# Patient Record
Sex: FEMALE | Race: BLACK OR AFRICAN AMERICAN | NOT HISPANIC OR LATINO
[De-identification: names, ages, dates, MRNs, and addresses within clinical notes are randomized per-mention and may not be internally consistent; named-entity substitution may affect disease eponyms.]

---

## 2017-12-09 ENCOUNTER — RESULT REVIEW (OUTPATIENT)
Age: 52
End: 2017-12-09

## 2023-11-03 ENCOUNTER — HOSPITAL ENCOUNTER (OUTPATIENT)
Dept: PHYSICAL THERAPY | Age: 58
Setting detail: RECURRING SERIES
Discharge: HOME | End: 2023-11-06
Payer: MEDICARE

## 2023-11-03 DIAGNOSIS — N39.41 URGE INCONTINENCE: Primary | ICD-10-CM

## 2023-11-03 DIAGNOSIS — R27.8 OTHER LACK OF COORDINATION: ICD-10-CM

## 2023-11-03 PROCEDURE — 97530 THERAPEUTIC ACTIVITIES: CPT

## 2023-11-03 PROCEDURE — 97162 PT EVAL MOD COMPLEX 30 MIN: CPT

## 2023-11-03 NOTE — THERAPY EVALUATION
intervention due to above mentioned deficits. Total Duration:  Time In: 1055  Time Out: 925 [facility] therapy, I certify that the treatment plan above will be carried out by a therapist or under their direction.   Thank you for this referral,  Joel Hayes, PT, DPT  Referring Physician Signature: Rivka Styles MD _______________________________ Date _____________        Post Session Pain  Charge Capture  PT Visit Info MD Guidelines  Olaf

## 2023-11-03 NOTE — PROGRESS NOTES
Colonic massage     Toilet positioning     Defecation dynamics     Sources of fiber     Return to intercourse/vaginal trainers/wand     Perineal massage     Sexual positioning     Lubricants/vaginal moisturizers     Vulvovaginal health/vaginal irritants     Body mechanics     Posture/ergonomics     Diaphragmatic breathing Supine 11/03/23   Pain science education     Resources and technology     Other patient education          THERAPEUTIC EXERCISE: (0 minutes):    Exercises per grid below to improve  -- . Required no  --  cues to  -- .  Progressed  --  as indicated. Date:  11/03/23 Date:   Date:     Activity/Exercise Parameters Parameters Parameters   Home Exercise Program (HEP) education  [employer] HEP access code --  [email or web address]  Focus on diaphragmatic breathing 5-10'/day, water intake, implementing urge suppression                    MANUAL THERAPY: (0 minutes):   --  was utilized and necessary because of the patient's  -- .   Date Type Location Comments   11/03/23 Internal assessment/treatment Via vaginal canal See eval                                       (Used abbreviations: MET - muscle energy technique; SCS- Strain counter strain; CTM-Connective tissue mobilizations; CR- Contract/Relax; SP- Sustained pressure; PIT- Positional inhibition techniques; STM Soft -tissue mobilization; MM- Myofascial mobilization; TrP-Trigger point release; IASTM- Instrument assisted soft tissue mobilizations, TDN-Trigger point dry needling)    Pt gives verbal consent to internal vaginal and rectal assessment/treatment without chaperon present. Treatment/Session Summary:    >Treatment Assessment:  Pt gives Verbal consent to internal vaginal and rectal assessment/treatment without chaperon present. Pt reports good understanding of plan of care, as well as prescribed home exercise program.  All questions were answered to pt's satisfaction.  Pt was invited to call with any further questions or

## 2023-11-04 ASSESSMENT — PAIN SCALES - GENERAL: PAINLEVEL_OUTOF10: 0

## 2023-11-09 NOTE — PROGRESS NOTES
Joleen Herrera  : 1965  Primary: Joselyn Clay Ppo (Medicare Managed)  Secondary:  [facility] [address] [address]  Phone: [phone]  Fax: [phone] Plan Frequency: 1x/week    Plan of Care/Certification Expiration Date: 24      >PT Visit Info:  Plan Frequency: 1x/week  Plan of Care/Certification Expiration Date: 24  No Show: 0  Canceled Appointment: 0      Visit Count:  2    OUTPATIENT PHYSICAL THERAPY: Treatment Note 11/10/2023       Episode  }Appt Desk               Treatment Diagnosis:    Urge incontinence  Other lack of coordination  Contributing Diagnosis:  Fecal smearing (Fecal soiling) (R15.1), Hesitancy of micturition (R39.11), Nocturia (R35.1), Overactive bladder (Detrusor muscle hyperactivity) (N32.81), Pelvic floor dysfunction in female (M62.98), and Urgency of urination (R39.15)  Medical/Referring Diagnosis:  Other specified urinary incontinence [N39.498]  Referring Physician:  Nancy Blake MD MD Orders:  PT Eval and Treat   Date of Onset:  Onset Date: 21     Allergies:   Patient has no allergy information on record. Restrictions/Precautions:  Restrictions/Precautions: Fall Risk  No data recorded     Interventions Planned (Treatment may consist of any combination of the following):    Current Treatment Recommendations: Strengthening; ROM; Balance training; Functional mobility training; Transfer training; ADL/Self-care training; IADL training; Endurance training; Gait training; Neuromuscular re-education; Manual; Stair training; Pain management; Return to work related activity; Home exercise program; Safety education & training; Patient/Caregiver education & training; Therapeutic activities       >Subjective Comments:  fecal incontinence, UUI, difficulty starting urine stream, tenderness with PFM palpation    Mera Moulding again this week while carrying groceries into house.   Started taking Meloxicam

## 2023-11-10 ENCOUNTER — HOSPITAL ENCOUNTER (OUTPATIENT)
Dept: PHYSICAL THERAPY | Age: 58
Setting detail: RECURRING SERIES
Discharge: HOME | End: 2023-11-13
Payer: MEDICARE

## 2023-11-10 PROCEDURE — 97530 THERAPEUTIC ACTIVITIES: CPT

## 2023-11-10 PROCEDURE — 97110 THERAPEUTIC EXERCISES: CPT

## 2023-11-16 NOTE — PROGRESS NOTES
Activity/Exercise Parameters Parameters Parameters   Home Exercise Program (HEP) education  Alisa Bergeron access code [facility]  [email or web address]  Focus on diaphragmatic breathing 5-10'/day, water intake, implementing urge suppression [facility] initiated Updated [employer] HEP   Open book coordinated w/breath  X10 B X10 B   Lower trunk rotation  X10 B X10 B   Stretches   Knee to opposite shoulder, adductor, pelvic floor, figure 4                        MANUAL THERAPY: (30 minutes): Soft tissue mobilization was utilized and necessary because of the patient's restricted motion of soft tissue. Date Type Location Comments   11/03/23 Internal assessment/treatment Via vaginal canal See eval   11/17/23 STM, CTM B adductors Mild restriction at distal quad/adductor interface L > R    MM Abdominals Very tender and painful                           (Used abbreviations: MET - muscle energy technique; SCS- Strain counter strain; CTM-Connective tissue mobilizations; CR- Contract/Relax; SP- Sustained pressure; PIT- Positional inhibition techniques; STM Soft -tissue mobilization; MM- Myofascial mobilization; TrP-Trigger point release; IASTM- Instrument assisted soft tissue mobilizations, TDN-Trigger point dry needling)        Treatment/Session Summary:    >Treatment Assessment:  Quite a bit of tension and pain in abdominals. She would benefit from continued manual therapy here. Communication/Consultation:  None today  Equipment provided today:   Handouts provided:  Updated [address] handouts. Recommendations/Intent for next treatment session: Next visit will focus on:  Check blood pressure at beginning of session. Manual therapy for abdominals. Review bladder diary. Progress therapeutic exercise promoting full PFM and diaphragm mobility. Consideration of biofeedback for PFM drops.   Future:  manual reassessment of PFM via vaginal canal.    >Total Treatment Billable Duration:  58 minutes  Time

## 2023-11-17 ENCOUNTER — HOSPITAL ENCOUNTER (OUTPATIENT)
Dept: PHYSICAL THERAPY | Age: 58
Setting detail: RECURRING SERIES
Discharge: HOME | End: 2023-11-20
Payer: MEDICARE

## 2023-11-17 PROCEDURE — 97530 THERAPEUTIC ACTIVITIES: CPT

## 2023-11-17 PROCEDURE — 97110 THERAPEUTIC EXERCISES: CPT

## 2023-11-17 PROCEDURE — 97140 MANUAL THERAPY 1/> REGIONS: CPT

## 2023-12-01 ENCOUNTER — HOSPITAL ENCOUNTER (OUTPATIENT)
Dept: PHYSICAL THERAPY | Age: 58
Setting detail: RECURRING SERIES
Discharge: HOME | End: 2023-12-04
Payer: MEDICARE

## 2023-12-01 PROCEDURE — 97140 MANUAL THERAPY 1/> REGIONS: CPT

## 2023-12-01 PROCEDURE — 97530 THERAPEUTIC ACTIVITIES: CPT

## 2023-12-01 PROCEDURE — 97110 THERAPEUTIC EXERCISES: CPT

## 2023-12-01 NOTE — PROGRESS NOTES
Bonnie Lundberg  : 1965  Primary: Sona Elder Ppo (Medicare Managed)  Secondary:  [facility] [address] [address]  Phone: [phone]  Fax: [phone] Plan Frequency: 1x/week    Plan of Care/Certification Expiration Date: 24      >PT Visit Info:  Plan Frequency: 1x/week  Plan of Care/Certification Expiration Date: 24  No Show: 0  Canceled Appointment: 0      Visit Count:  4    OUTPATIENT PHYSICAL THERAPY: Treatment Note 2023       Episode  }Appt Desk               Treatment Diagnosis:    Urge incontinence  Other lack of coordination  Contributing Diagnosis:  Fecal smearing (Fecal soiling) (R15.1), Hesitancy of micturition (R39.11), Nocturia (R35.1), Overactive bladder (Detrusor muscle hyperactivity) (N32.81), Pelvic floor dysfunction in female (M62.98), and Urgency of urination (R39.15)  Medical/Referring Diagnosis:  Other specified urinary incontinence [N39.498]  Referring Physician:  Briseyda Alexander MD MD Orders:  PT Eval and Treat   Date of Onset:  Onset Date: 21     Allergies:   Patient has no allergy information on record. Restrictions/Precautions:  Restrictions/Precautions: Fall Risk  No data recorded     Interventions Planned (Treatment may consist of any combination of the following):    Current Treatment Recommendations: Strengthening; ROM; Balance training; Functional mobility training; Transfer training; ADL/Self-care training; IADL training; Endurance training; Gait training; Neuromuscular re-education; Manual; Stair training; Pain management; Return to work related activity; Home exercise program; Safety education & training; Patient/Caregiver education & training; Therapeutic activities       >Subjective Comments:  fecal incontinence, UUI, difficulty starting urine stream, tenderness with PFM palpation    No appointment last week due to out of town for the holiday.   Realized when she had

## 2023-12-08 ENCOUNTER — HOSPITAL ENCOUNTER (OUTPATIENT)
Dept: PHYSICAL THERAPY | Age: 58
Setting detail: RECURRING SERIES
Discharge: HOME | End: 2023-12-11
Payer: MEDICARE

## 2023-12-08 PROCEDURE — 97110 THERAPEUTIC EXERCISES: CPT

## 2023-12-08 PROCEDURE — 97112 NEUROMUSCULAR REEDUCATION: CPT

## 2023-12-08 PROCEDURE — 97530 THERAPEUTIC ACTIVITIES: CPT

## 2023-12-08 NOTE — PROGRESS NOTES
Very tender and painful   12/01/23 MM, CTM Abdominals     STM L adductors     STM, CTM, SP Via vaginal canal See chart in objective section    MM, CTM, STM Low back In prone                     (Used abbreviations: MET - muscle energy technique; SCS- Strain counter strain; CTM-Connective tissue mobilizations; CR- Contract/Relax; SP- Sustained pressure; PIT- Positional inhibition techniques; STM Soft -tissue mobilization; MM- Myofascial mobilization; TrP-Trigger point release; IASTM- Instrument assisted soft tissue mobilizations, TDN-Trigger point dry needling)    Pt gives verbal consent to internal vaginal assessment/treatment without chaperon present. Review of Goals:  Short-Term Functional Goals: Time Frame: 6 weeks  Pt will demonstrate I with basic PFM HEP to improve awareness, coordination, and timing of PFM. GOAL MET  2. Patient will demonstrate understanding of and ability to teach back appropriate water and fiber intake, toileting frequency, and positioning for improved self-management of symptoms. GOAL MET  3. Patient will demonstrate understanding of and ability to teach back appropriate water intake, bladder irritants, toileting frequency, and positioning for improved self-management of symptoms. GOAL MET  4. Patient will demonstrate appropriate use of the pelvic floor muscle group (quick flicks and/or drops), without compensation, to implement urge suppression appropriately with urgency of urination and decrease the number of pads per day or UI episodes by 25%. GOAL PARTIALLY MET (independent with quick flicks, unknown yet on decrease in pad usage)  5. Patient will demonstrate ability to perform diaphragmatic breathing in multiple positions to assist in pelvic floor tension reduction. GOAL MET  6. Patient will verbalize understanding and demonstrate postural adjustments which facilitate lengthening and reduce overall pelvic floor muscle activity.    GOAL NOT YET MET      Treatment/Session

## 2023-12-15 ENCOUNTER — HOSPITAL ENCOUNTER (OUTPATIENT)
Dept: PHYSICAL THERAPY | Age: 58
Setting detail: RECURRING SERIES
End: 2023-12-15
Payer: MEDICARE

## 2023-12-15 NOTE — PROGRESS NOTES
Tanika Otero  : 1965  Primary: [employer] Medicare-advantage Ppo  Secondary:  [facility] @ [address]  [facility] [address]  Phone: [phone]  Fax: [phone]    PT Visit Info:    No Show: 0  Canceled Appointment: 1     Total visits:  5   OUTPATIENT THERAPY: 12/15/2023  Episode  Appt Desk        Tanika Otero cancelled her appointment for today <24 hours notice due to illness.  Will plan to follow up next during next appointment.    Thank you,  Solange Mckeon PT, DPT    Future Appointments   Date Time Provider Department Center   12/15/2023  9:00 AM Solange Mckeon PT [facility]   2023  1:00 PM Solange Mckeon, PT [facility]   2023  9:00 AM Solange Mckeon PT [facility]

## 2023-12-22 ENCOUNTER — HOSPITAL ENCOUNTER (OUTPATIENT)
Dept: PHYSICAL THERAPY | Age: 58
Setting detail: RECURRING SERIES
End: 2023-12-22
Payer: MEDICARE

## 2023-12-22 DIAGNOSIS — N39.41 URGE INCONTINENCE: Primary | ICD-10-CM

## 2023-12-22 DIAGNOSIS — R27.8 OTHER LACK OF COORDINATION: ICD-10-CM

## 2023-12-22 NOTE — PROGRESS NOTES
Tanika Otero  : 1965  Primary: [employer] Medicare-advantage Ppo  Secondary:  [facility] @ [address]  [facility] [address]  Phone: [phone]  Fax: [phone]    PT Visit Info:    No Show: 0  Canceled Appointment: 2    Total visits:  6   OUTPATIENT THERAPY: 2023  Episode  Appt Desk        Tanika Otero cancelled her appointment for today due to conflicting appointment - needed appointment with her physician.     Thank you,  Solange Mckeon, PT, DPT    Future Appointments   Date Time Provider Department Center   2023  9:00 AM Solange Mckeon, PT [facility]

## 2024-01-05 ENCOUNTER — HOSPITAL ENCOUNTER (OUTPATIENT)
Dept: PHYSICAL THERAPY | Age: 59
Setting detail: RECURRING SERIES
Discharge: HOME | End: 2024-01-08
Payer: MEDICARE

## 2024-01-05 PROCEDURE — 97110 THERAPEUTIC EXERCISES: CPT

## 2024-01-05 NOTE — PROGRESS NOTES
breathing Supine;  Sidelying;  Supine, sidelying, sitting;  Sidelying 11/03/23;  11/10/23;  11/17/23;  12/01/23   Pain science education   Reviewed role of active movement in pain desensitization, use of ice for pain and mechanism of benefit 12/01/23;  12/08/23   Resources and technology     Other patient education Self-reflection each night on 3 things she did that were healthy choices that day.  Importance in paying attention to referred pain symptoms and changing positions before they progress or peripheralize, with a focus on centralization of symptoms for sign of improvement.  Prone press-ups on flat surface or lying over one pillow as an option. 11/17/23 12/20/23          NEUROMUSCULAR RE-EDUCATION: (0 minutes):    Exercise/activities per grid below to improve coordination, posture, and proprioception.  Required moderate visual and verbal cues to promote coordination of pelvic floor with breath, ability to perform submaximal contractions, and return to appropriate rest level .    THERAPEUTIC EXERCISE: (50 minutes):    Exercises per grid below to improve mobility and coordination.  Required no visual and verbal cues to promote proper body alignment and promote proper body breathing techniques.  Progressed complexity of movement as indicated.   Date:  12/01/23 Date:  12/08/23 Date:  12/20/23 Date:  01/05/24   Activity/Exercise Parameters Parameters Parameters Parameters   Home Exercise Program (HEP) education  [employer] HEP access code [email or web address]  [email or web address]  Updated [employer] HEP   Updated [employer] HEP   Open book coordinated w/breath X5 B X10 B     Lower trunk rotation X5 B      Stretches   Knee to opposite shoulder, figure 4, happy baby    Forward trunk flexion in sitting   Large Swiss ball x10    Cat cow coordinated w/breath x8  x10 x8   Tail wags    X6 B   Hamstring curls in supine x10  X10 on exhale 2x5 on exhale   Shoulder rowing/horizontal abduction in supine legs on ball

## 2024-01-09 ENCOUNTER — HOSPITAL ENCOUNTER (OUTPATIENT)
Dept: PHYSICAL THERAPY | Age: 59
Setting detail: RECURRING SERIES
End: 2024-01-09
Payer: MEDICARE

## 2024-01-09 NOTE — PROGRESS NOTES
Tanika Otero  : 1965  Primary: [employer] Medicare-advantage Ppo  Secondary:  [facility] @ [address]  [facility] [address]  Phone: [phone]  Fax: [phone]    PT Visit Info:    No Show: 0  Canceled Appointment: 2     OT Visit Info:  No data recorded    OUTPATIENT THERAPY: 2024  Episode  Appt Desk        Tanika Otero cancelled her appointment for today >24 hours in advance due to conflicting appointment.  Will plan to follow up next during next appointment.    Thank you,  Solange Mckeon, PT, DPT    Future Appointments   Date Time Provider Department Center   2024  7:00 PM Solange Mckeon, PT [facility]   2024 10:00 AM Solange Mckeon, PT [facility]   2024 10:00 AM Solange Mckeon, PT [facility]   2024  9:00 AM Solange Mckeon, PT [facility]

## 2024-01-18 NOTE — PROGRESS NOTES
Appointments   Date Time Provider Department Center   1/26/2024 10:00 AM Solange Mckeon PT [facility]   2/2/2024  9:00 AM Solange Mckeon PT [facility]

## 2024-01-18 NOTE — THERAPY RECERTIFICATION
Tanika Otero  : 1965  Primary: [employer] Medicare-advantage Ppo (Medicare Managed)  Secondary:  [facility] @ [address]  [facility] [address]  Phone: [phone]  Fax: [phone] Plan Frequency: 1x/week    Plan of Care/Certification Expiration Date: 24      PT Visit Info:    Plan Frequency: 1x/week  Plan of Care/Certification Expiration Date: 24  No Show: 0  Canceled Appointment: 3      Visit Count:  8                OUTPATIENT PHYSICAL THERAPY:             Recertification 2024               Episode (PFPT) Appt Desk         Treatment Diagnosis:    Urge incontinence  Other lack of coordination  Contributing Diagnosis:  Fecal smearing (Fecal soiling) (R15.1), Hesitancy of micturition (R39.11), Nocturia (R35.1), Overactive bladder (Detrusor muscle hyperactivity) (N32.81), Pelvic floor dysfunction in female (M62.98), and Urgency of urination (R39.15)  Medical/Referring Diagnosis:  Other specified urinary incontinence [N39.498]  Referring Physician:  Aicha Yarbrough MD MD Orders:  PT Eval and Treat   Return MD Appt:  23  Date of Onset:  Onset Date: 21      Allergies:  Patient has no allergy information on record.  Restrictions/Precautions:    Restrictions/Precautions: Fall Risk     Medications Last Reviewed:  2024     SUBJECTIVE   History of Injury/Illness (Reason for Referral):  Ms. Otero is a 57 yo female referred to pelvic floor physical therapy (PFPT) by Aicha Yarbrough MD 2/2 Other specified urinary incontinence [N39.498].    Urinary urgency, urge incontinence, nocturia, occasional fecal incontinence.  Background history of back pain and fall on coccyx.      UPDATE 24:  Leakage hasn't been that frequent, but is most concerned with the urgency.  Trying distraction as an urge suppression technique, has some leakage when waiting too long.  Improved flow of urine, no hesitancy or intermittency.    Feels

## 2024-01-19 ENCOUNTER — HOSPITAL ENCOUNTER (OUTPATIENT)
Dept: PHYSICAL THERAPY | Age: 59
Setting detail: RECURRING SERIES
Discharge: HOME | End: 2024-01-22
Payer: MEDICARE

## 2024-01-19 DIAGNOSIS — N39.41 URGE INCONTINENCE: Primary | ICD-10-CM

## 2024-01-19 DIAGNOSIS — R27.8 OTHER LACK OF COORDINATION: ICD-10-CM

## 2024-01-19 PROCEDURE — 97530 THERAPEUTIC ACTIVITIES: CPT

## 2024-01-19 PROCEDURE — 97110 THERAPEUTIC EXERCISES: CPT

## 2024-01-19 PROCEDURE — 97140 MANUAL THERAPY 1/> REGIONS: CPT

## 2024-01-26 ENCOUNTER — HOSPITAL ENCOUNTER (OUTPATIENT)
Dept: PHYSICAL THERAPY | Age: 59
Setting detail: RECURRING SERIES
End: 2024-01-26
Payer: MEDICARE

## 2024-01-26 NOTE — PROGRESS NOTES
Tanika Otreo  : 1965  Primary: [employer] Medicare-advantage Ppo  Secondary:  [facility] @ [address]  [facility] [address]  Phone: [phone]  Fax: [phone]    PT Visit Info:    Pelvic health visits this episode:  8  No Show: 0  Canceled Appointment: 4        OUTPATIENT THERAPY: 2024  Episode  Appt Desk        Tanika Otero cancelled her appointment for today due to illness.  Will plan to follow up next during next appointment.    Thank you,  Solange Mckeon PT, DPT    Future Appointments   Date Time Provider Department Center   2024 10:00 AM Solange Mckeon PT [facility]   2024  9:00 AM Solange Mckeon, PT [facility]   2024  9:00 AM Solange Mckeon PT [facility]

## 2024-02-02 ENCOUNTER — HOSPITAL ENCOUNTER (OUTPATIENT)
Dept: PHYSICAL THERAPY | Age: 59
Setting detail: RECURRING SERIES
Discharge: HOME | End: 2024-02-05
Payer: MEDICARE

## 2024-02-02 PROCEDURE — 97110 THERAPEUTIC EXERCISES: CPT

## 2024-02-02 NOTE — PROGRESS NOTES
Tanika Otero  : 1965  Primary: [employer] Medicare-advantage Ppo (Medicare Managed)  Secondary:  [facility] @ [address]  [place] [address]  Phone: [phone]  Fax: [phone] Plan Frequency: 1x/week    Plan of Care/Certification Expiration Date: 24      >PT Visit Info:  Plan Frequency: 1x/week  Plan of Care/Certification Expiration Date: 24  No Show: 0  Canceled Appointment: 4      Visit Count:  9    OUTPATIENT PHYSICAL THERAPY: Treatment Note 2024       Episode  }Appt Desk               Treatment Diagnosis:    Urge incontinence  Other lack of coordination  Contributing Diagnosis:  Fecal smearing (Fecal soiling) (R15.1), Hesitancy of micturition (R39.11), Nocturia (R35.1), Overactive bladder (Detrusor muscle hyperactivity) (N32.81), Pelvic floor dysfunction in female (M62.98), and Urgency of urination (R39.15)  Medical/Referring Diagnosis:  Other specified urinary incontinence [N39.498]  Referring Physician:  Aicha Yarbrough MD MD Orders:  PT Eval and Treat   Date of Onset:  Onset Date: 21     Allergies:   Patient has no allergy information on record.  Restrictions/Precautions:  Restrictions/Precautions: Fall Risk  No data recorded     Interventions Planned (Treatment may consist of any combination of the following):    Current Treatment Recommendations: Strengthening; ROM; Balance training; Functional mobility training; Transfer training; ADL/Self-care training; IADL training; Endurance training; Gait training; Neuromuscular re-education; Manual; Stair training; Pain management; Return to work related activity; Home exercise program; Safety education & training; Patient/Caregiver education & training; Therapeutic activities       >Subjective Comments:  fecal incontinence, UUI    Saw urologist on Monday, follow-up as needed.  Discussed possibility of a sacral neuro-modulator for the fecal incontinence, has paperwork on it and

## 2024-02-08 NOTE — PROGRESS NOTES
alignment and promote proper body breathing techniques.  Progressed complexity of movement as indicated.   Date:  01/05/24 Date:  01/19/24 Date:  02/02/24 Date:  02/09/24   Activity/Exercise Parameters Parameters Parameters Parameters   Home Exercise Program (HEP) education  [employer] HEP access code [email or web address]  [email or web address]  Updated [employer] HEP Updated [employer] HEP Updated [employer] HEP No changes   Open book coordinated w/breath       Lower trunk rotation    x10   Stretches  Knee to opposite shoulder, figure 4 with strap Figure 4, knee to opposite shoulder Knee to opposite shoulder   Forward trunk flexion in sitting       Cat cow coordinated w/breath x8      Tail wags X6 B      Hamstring curls in supine 2x5 on exhale   X10 on exhale   Shoulder rowing/horizontal abduction in supine legs on ball 4x2      Leg lateral shifts on ball with TA/Kegel alternating with PFM drops 6x2      Bridge 2nd position x10 x10     Clam 3x5 B, with Kegel  X5 B, with Kegel    Reverse clam 3x5 B with Kegel  X5 B, with Kegel    Biofeedback       PFM drops Supine, Sidelying Standing Standing    Scapular retraction x8 L1 band x10 L1 band x10    Lat pulls on exhale  L4 Blue band 3x5 L4 Blue band 3x5    Wall push-ups w/Kegel & TA  2x5     Mart exercises   Hip abduction & extension 2x5 B each    NuStep   L4 x9', L1 x1'        MANUAL THERAPY: (0 minutes):   Soft tissue mobilization was utilized and necessary because of the patient's restricted motion of soft tissue.   Date Type Location Comments   11/03/23 Internal assessment/treatment Via vaginal canal See eval   11/17/23 STM, CTM B adductors Mild restriction at distal quad/adductor interface L > R    MM Abdominals Very tender and painful   12/01/23 MM, CTM Abdominals     STM L adductors     STM, CTM, SP Via vaginal canal See chart in objective section    MM, CTM, STM Low back In prone   01/19/24 STM, CTM Via vaginal canal B obturator internus               (Used

## 2024-02-09 ENCOUNTER — HOSPITAL ENCOUNTER (OUTPATIENT)
Dept: PHYSICAL THERAPY | Age: 59
Setting detail: RECURRING SERIES
Discharge: HOME | End: 2024-02-12
Payer: MEDICARE

## 2024-02-09 PROCEDURE — 97530 THERAPEUTIC ACTIVITIES: CPT

## 2024-02-09 PROCEDURE — 97110 THERAPEUTIC EXERCISES: CPT

## 2024-02-16 ENCOUNTER — HOSPITAL ENCOUNTER (OUTPATIENT)
Dept: PHYSICAL THERAPY | Age: 59
Setting detail: RECURRING SERIES
Discharge: HOME | End: 2024-02-19
Payer: MEDICARE

## 2024-02-16 PROCEDURE — 97110 THERAPEUTIC EXERCISES: CPT

## 2024-02-16 PROCEDURE — 97530 THERAPEUTIC ACTIVITIES: CPT

## 2024-02-16 ASSESSMENT — PAIN SCALES - GENERAL: PAINLEVEL_OUTOF10: 5

## 2024-02-16 NOTE — PROGRESS NOTES
Tanika Otero  : 1965  Primary: [employer] Medicare-advantage Ppo (Medicare Managed)  Secondary:  [facility] @ [address]  [facility] [address]  Phone: [phone]  Fax: [phone] Plan Frequency: 1x/week    Plan of Care/Certification Expiration Date: 24      >PT Visit Info:  Plan Frequency: 1x/week  Plan of Care/Certification Expiration Date: 24  Progress Note Counter: 1  Progress Note Due Date: 24  No Show: 0  Canceled Appointment: 4      Visit Count:  11    OUTPATIENT PHYSICAL THERAPY: Treatment Note and Progress Report 2024       Episode  }Appt Desk               Treatment Diagnosis:    Urge incontinence  Other lack of coordination  Contributing Diagnosis:  Fecal smearing (Fecal soiling) (R15.1), Hesitancy of micturition (R39.11), Nocturia (R35.1), Overactive bladder (Detrusor muscle hyperactivity) (N32.81), Pelvic floor dysfunction in female (M62.98), and Urgency of urination (R39.15)  Medical/Referring Diagnosis:  Other specified urinary incontinence [N39.498]  Referring Physician:  Aicha Yarbrough MD MD Orders:  PT Eval and Treat   Date of Onset:  Onset Date: 21     Allergies:   Patient has no allergy information on record.  Restrictions/Precautions:  Restrictions/Precautions: Fall Risk  No data recorded     Interventions Planned (Treatment may consist of any combination of the following):    Current Treatment Recommendations: Strengthening; ROM; Balance training; Functional mobility training; Transfer training; ADL/Self-care training; IADL training; Endurance training; Gait training; Neuromuscular re-education; Manual; Stair training; Pain management; Return to work related activity; Home exercise program; Safety education & training; Patient/Caregiver education & training; Therapeutic activities       >Subjective Comments:  fecal incontinence, UUI  Ordered a squatty potty and received it.  Doing bowel massage and deep

## 2024-02-23 ENCOUNTER — HOSPITAL ENCOUNTER (OUTPATIENT)
Dept: PHYSICAL THERAPY | Age: 59
Setting detail: RECURRING SERIES
Discharge: HOME | End: 2024-02-26
Payer: MEDICARE

## 2024-02-23 PROCEDURE — 97110 THERAPEUTIC EXERCISES: CPT

## 2024-02-23 ASSESSMENT — PAIN SCALES - GENERAL: PAINLEVEL_OUTOF10: 4

## 2024-02-23 NOTE — PROGRESS NOTES
shoulder Knee to opposite shoulder Knee to opposite shoulder, adductors, figure 4 Knee to opposite shoulder, figure 4, adductors   Hamstring curls with ball  X10 on exhale X10 on exhale X10 on exhale   Straight leg raise with TA/small Kegel   X5 B X7 B on exhale   Bent knee fallout with TA/small Kegel   X5 B, PFM drop after each side for 10s    Bridge 2nd position   x10    Clam X5 B, with Kegel      Reverse clam X5 B, with Kegel      PFM drops Standing      Scapular retraction L1 band x10  No resistance x10 L1 x10 on exhale   Lat pulls on exhale L4 Blue band 3x5   L4 Blue band x10   Wall push-ups w/Kegel & TA    x10   [place] exercises Hip abduction & extension 2x5 B each  Hip abduction & extension x10 B, each, PFM drop after each  Hip abduction x8 B, hip extension with hip IR and focus on glut set x8 B   Balance exercises    Tandem stance with reaches   NuStep L4 x9', L1 x1'   L2 x6' L4 x10'        MANUAL THERAPY: (0 minutes):   Soft tissue mobilization was utilized and necessary because of the patient's restricted motion of soft tissue.   Date Type Location Comments   11/03/23 Internal assessment/treatment Via vaginal canal See eval   11/17/23 STM, CTM B adductors Mild restriction at distal quad/adductor interface L > R    MM Abdominals Very tender and painful   12/01/23 MM, CTM Abdominals     STM L adductors     STM, CTM, SP Via vaginal canal See chart in objective section    MM, CTM, STM Low back In prone   01/19/24 STM, CTM Via vaginal canal B obturator internus               (Used abbreviations: MET - muscle energy technique; SCS- Strain counter strain; CTM-Connective tissue mobilizations; CR- Contract/Relax; SP- Sustained pressure; PIT- Positional inhibition techniques; STM Soft -tissue mobilization; MM- Myofascial mobilization; TrP-Trigger point release; IASTM- Instrument assisted soft tissue mobilizations, TDN-Trigger point dry needling)    Pt gives verbal consent to internal vaginal assessment/treatment

## 2024-03-08 ENCOUNTER — HOSPITAL ENCOUNTER (OUTPATIENT)
Dept: PHYSICAL THERAPY | Age: 59
Setting detail: RECURRING SERIES
End: 2024-03-08
Payer: MEDICARE

## 2024-03-08 NOTE — PROGRESS NOTES
Tanika Otero  : 1965  Primary: [employer] Medicare-advantage Ppo  Secondary:  [facility] @ [address]  [facility] [place] [address]  Phone: [phone]  Fax: [phone]    PT Visit Info:    Progress Note Counter: 1  Progress Note Due Date: 24  No Show: 0  Canceled Appointment: 5        OUTPATIENT THERAPY: 3/8/2024  Episode  Appt Desk        Tanika Otero cancelled her appointment for today <24 hours in advance due to reasons not given.  Will plan to follow up next during next appointment.      Per our cancellation policy, when cancellations exceed three times over a 2 month period, continuation of care is at the discretion of the treating therapist.  Policy will be reviewed at next appointment and we will discuss what will work best for her schedule and plan of care.    Thank you,  Solange Mckeon, PT, DPT    Future Appointments   Date Time Provider Department Center   3/15/2024 10:00 AM Solange Mckeon, PT [facility]   3/22/2024 10:00 AM Solange Mckeon, PT [facility]   3/29/2024 10:00 AM Solange Mckeon, PT [facility]   2024 10:00 AM Solange Mckeon, PT [facility]

## 2024-03-15 ENCOUNTER — HOSPITAL ENCOUNTER (OUTPATIENT)
Dept: PHYSICAL THERAPY | Age: 59
Setting detail: RECURRING SERIES
Discharge: HOME | End: 2024-03-18
Payer: MEDICARE

## 2024-03-15 PROCEDURE — 97110 THERAPEUTIC EXERCISES: CPT

## 2024-03-15 PROCEDURE — 97140 MANUAL THERAPY 1/> REGIONS: CPT

## 2024-03-15 PROCEDURE — 97530 THERAPEUTIC ACTIVITIES: CPT

## 2024-03-15 ASSESSMENT — PAIN SCALES - GENERAL: PAINLEVEL_OUTOF10: 5

## 2024-03-15 NOTE — PROGRESS NOTES
Positional inhibition techniques; STM Soft -tissue mobilization; MM- Myofascial mobilization; TrP-Trigger point release; IASTM- Instrument assisted soft tissue mobilizations, TDN-Trigger point dry needling)    Pt gives verbal consent to internal vaginal assessment/treatment without chaperon present.        Progress toward goals as of 03/15/24:  Goals: (Goals have been discussed and agreed upon with patient.)  Short-Term Functional Goals: Time Frame: 6 weeks  Pt will demonstrate I with basic PFM HEP to improve awareness, coordination, and timing of PFM.  GOAL MET  2.   Patient will demonstrate understanding of and ability to teach back appropriate water and fiber intake, toileting frequency, and positioning for improved self-management of symptoms.   GOAL MET  3.   Patient will demonstrate understanding of and ability to teach back appropriate water intake, bladder irritants, toileting frequency, and positioning for improved self-management of symptoms.   GOAL MET  4.  Patient will demonstrate appropriate use of the pelvic floor muscle group (quick flicks and/or drops), without compensation, to implement urge suppression appropriately with urgency of urination and decrease the number of pads per day or UI episodes by 25%.  GOAL MOSTLY MET  5.  Patient will demonstrate ability to perform diaphragmatic breathing in multiple positions to assist in pelvic floor tension reduction.  GOAL MET  6.  Patient will verbalize understanding and demonstrate postural adjustments which facilitate lengthening and reduce overall pelvic floor muscle activity.   GOAL MET     Discharge Goals: Time Frame: 12 weeks  Patient will demonstrate ability to increase intra-abdominal pressure and eccentrically contract the pelvic floor muscles without breath holding, as assessed by digitally or with use of sEMG biofeedback, in order to improve bowel evacuation.  GOAL MET  Patient will report a reduction in episodes of FI by 50% or greater.  GOAL

## 2024-03-21 NOTE — PROGRESS NOTES
Parameters   Home Exercise Program (HEP) education  [employer] HEP access code [email or web address]  [email or web address]  No changes No changes; pt to work on diaphragmatic breathing in sitting No changes. No changes   Coordinated of breath with movement Bicep curls with 2# weights on exhale Bicep curls with 3# weights on exhale x10, pallof press with 3# weight on exhale x10     Lower trunk rotation x10 x10     Stretches Knee to opposite shoulder, adductors, figure 4 Knee to opposite shoulder, figure 4, adductors Knee to opposite shoulder, figure 4, lower trunk rotation Lower trunk rotation, knee to opposite shoulder   Hamstring curls with ball X10 on exhale X10 on exhale X15 on exhale    Straight leg raise with TA/small Kegel X5 B X7 B on exhale     Bent knee fallout with TA/small Kegel X5 B, PFM drop after each side for 10s      Bridge 2nd position x10      PFM drops    Standing x10   Scapular retraction No resistance x10 L1 x10 on exhale No resistance x15 No resistance 2x10   Lat pulls on exhale  L4 Blue band x10     Wall push-ups w/Kegel & TA  x10     [place] exercises Hip abduction & extension x10 B, each, PFM drop after each  Hip abduction x8 B, hip extension with hip IR and focus on glut set x8 B Hip abduction x7 B, hip extension with hip IR and focus on glut set x7 B Hip abduction x10 B, hip extension with hip IR and focus on glut set x10 B   Balance exercises  Tandem stance with reaches Tandem stance with single arm chest press L2 band x7 B, with single arm row L2 band x7 B Tandem stance with single arm chest press L3 band x10 B, single arm row L3 band x10 B;  Tandem walk fwd/bkwd 2x5';  Single leg stance on L leg x20' (not tolerated on R)   NuStep L2 x6' L4 x10'  L3 x10'        MANUAL THERAPY: (0 minutes):   Soft tissue mobilization was utilized and necessary because of the patient's restricted motion of soft tissue.   Date Type Location Comments   11/03/23 Internal assessment/treatment Via vaginal canal

## 2024-03-22 ENCOUNTER — HOSPITAL ENCOUNTER (OUTPATIENT)
Dept: PHYSICAL THERAPY | Age: 59
Setting detail: RECURRING SERIES
Discharge: HOME | End: 2024-03-25
Payer: MEDICARE

## 2024-03-22 PROCEDURE — 97110 THERAPEUTIC EXERCISES: CPT

## 2024-03-22 PROCEDURE — 97530 THERAPEUTIC ACTIVITIES: CPT

## 2024-03-22 ASSESSMENT — PAIN SCALES - GENERAL: PAINLEVEL_OUTOF10: 6

## 2024-03-29 ENCOUNTER — HOSPITAL ENCOUNTER (OUTPATIENT)
Dept: PHYSICAL THERAPY | Age: 59
Setting detail: RECURRING SERIES
Discharge: HOME | End: 2024-04-01
Payer: MEDICARE

## 2024-03-29 PROCEDURE — 97110 THERAPEUTIC EXERCISES: CPT

## 2024-03-29 ASSESSMENT — PAIN SCALES - GENERAL: PAINLEVEL_OUTOF10: 5

## 2024-03-29 NOTE — PROGRESS NOTES
Tanika Otero  : 1965  Primary: [employer] Medicare-advantage Ppo (Medicare Managed)  Secondary:  [facility] @ [address]  [facility] [address]  Phone: [phone]  Fax: [phone] Plan Frequency: 1x/week    Plan of Care/Certification Expiration Date: 24      >PT Visit Info:  Plan Frequency: 1x/week  Plan of Care/Certification Expiration Date: 24  Progress Note Counter: 2  Progress Note Due Date: 24  No Show: 0  Canceled Appointment: 5      Visit Count:  15    OUTPATIENT PHYSICAL THERAPY: Treatment Note 3/29/2024       Episode  }Appt Desk               Treatment Diagnosis:    Urge incontinence  Other lack of coordination  Contributing Diagnosis:  Fecal smearing (Fecal soiling) (R15.1), Hesitancy of micturition (R39.11), Nocturia (R35.1), Overactive bladder (Detrusor muscle hyperactivity) (N32.81), Pelvic floor dysfunction in female (M62.98), and Urgency of urination (R39.15)  Medical/Referring Diagnosis:  Other specified urinary incontinence [N39.498]  Referring Physician:  Aicha Yarbrough MD MD Orders:  PT Eval and Treat   Date of Onset:  Onset Date: 21     Allergies:   Patient has no allergy information on record.  Restrictions/Precautions:  Restrictions/Precautions: Fall Risk  No data recorded     Interventions Planned (Treatment may consist of any combination of the following):    Current Treatment Recommendations: Strengthening; ROM; Balance training; Functional mobility training; Transfer training; ADL/Self-care training; IADL training; Endurance training; Gait training; Neuromuscular re-education; Manual; Stair training; Pain management; Return to work related activity; Home exercise program; Safety education & training; Patient/Caregiver education & training; Therapeutic activities       >Subjective Comments:  fecal incontinence, UUI  Fecal incontinence and urinary sx \"have been going a lot better.\"  Less urinary hesitancy

## 2024-04-05 ENCOUNTER — APPOINTMENT (OUTPATIENT)
Dept: PHYSICAL THERAPY | Age: 59
End: 2024-04-05
Payer: MEDICARE

## 2024-04-11 NOTE — THERAPY DISCHARGE
Tanika Otero  : 1965  Primary: [employer]-advantage Ppo (Medicare Managed)  Secondary:  [facility] @ [address]  [facility] [address]  Phone: [phone]  Fax: [phone] Plan Frequency: 1x/week    Plan of Care/Certification Expiration Date: 24      PT Visit Info:    Plan Frequency: 1x/week  Plan of Care/Certification Expiration Date: 24  Progress Note Counter: 2  Progress Note Due Date: 24  No Show: 0  Canceled Appointment: 5      Visit Count:  16                OUTPATIENT PHYSICAL THERAPY:             Discharge Summary 2024               Episode (PFPT) Appt Desk         Treatment Diagnosis:    Urge incontinence  Other lack of coordination  Contributing Diagnosis:  Fecal smearing (Fecal soiling) (R15.1), Hesitancy of micturition (R39.11), Nocturia (R35.1), Overactive bladder (Detrusor muscle hyperactivity) (N32.81), Pelvic floor dysfunction in female (M62.98), and Urgency of urination (R39.15)  Medical/Referring Diagnosis:  Other specified urinary incontinence [N39.498]  Referring Physician:  Aicha Yarbrough MD MD Orders:  PT Eval and Treat   Return MD Appt:  23  Date of Onset:  Onset Date: 21      Allergies:  Patient has no allergy information on record.  Restrictions/Precautions:    Restrictions/Precautions: Fall Risk     Medications Last Reviewed:  2024     SUBJECTIVE   History of Injury/Illness (Reason for Referral):  Ms. Otero is a 59 yo female referred to pelvic floor physical therapy (PFPT) by Aicha Yarbrough MD 2/2 Other specified urinary incontinence [N39.498].    Urinary urgency, urge incontinence, nocturia, occasional fecal incontinence.  Background history of back pain and fall on coccyx.      Update 24:  Leakage hasn't been that frequent, but is most concerned with the urgency.  Trying distraction as an urge suppression technique, has some leakage when waiting too long.

## 2024-04-11 NOTE — PROGRESS NOTES
Tanika Otero  : 1965  Primary: [employer] Medicare-advantage Ppo (Medicare Managed)  Secondary:  [facility] @ [address]  [place] [address]  Phone: [phone]  Fax: [phone] Plan Frequency: 1x/week    Plan of Care/Certification Expiration Date: 24      >PT Visit Info:  Plan Frequency: 1x/week  Plan of Care/Certification Expiration Date: 24  Progress Note Counter: 2  Progress Note Due Date: 24  No Show: 0  Canceled Appointment: 5      Visit Count:  16    OUTPATIENT PHYSICAL THERAPY: Treatment Note 2024       Episode  }Appt Desk               Treatment Diagnosis:    Urge incontinence  Other lack of coordination  Contributing Diagnosis:  Fecal smearing (Fecal soiling) (R15.1), Hesitancy of micturition (R39.11), Nocturia (R35.1), Overactive bladder (Detrusor muscle hyperactivity) (N32.81), Pelvic floor dysfunction in female (M62.98), and Urgency of urination (R39.15)  Medical/Referring Diagnosis:  Other specified urinary incontinence [N39.498]  Referring Physician:  Aicha Yarbrough MD MD Orders:  PT Eval and Treat   Date of Onset:  Onset Date: 21     Allergies:   Patient has no allergy information on record.  Restrictions/Precautions:  Restrictions/Precautions: Fall Risk  No data recorded     Interventions Planned (Treatment may consist of any combination of the following):    Current Treatment Recommendations: Strengthening; ROM; Balance training; Functional mobility training; Transfer training; ADL/Self-care training; IADL training; Endurance training; Gait training; Neuromuscular re-education; Manual; Stair training; Pain management; Return to work related activity; Home exercise program; Safety education & training; Patient/Caregiver education & training; Therapeutic activities       >Subjective Comments:  fecal incontinence, UUI         >Initial:     2/10 - R shoulder  >Post Session:       2/10  Medications Last

## 2024-04-12 ENCOUNTER — HOSPITAL ENCOUNTER (OUTPATIENT)
Dept: PHYSICAL THERAPY | Age: 59
Setting detail: RECURRING SERIES
Discharge: HOME | End: 2024-04-15
Payer: MEDICARE

## 2024-04-12 PROCEDURE — 97110 THERAPEUTIC EXERCISES: CPT

## 2024-04-12 ASSESSMENT — PAIN SCALES - GENERAL: PAINLEVEL_OUTOF10: 2
